# Patient Record
Sex: FEMALE | Race: OTHER | NOT HISPANIC OR LATINO | Employment: UNEMPLOYED | URBAN - METROPOLITAN AREA
[De-identification: names, ages, dates, MRNs, and addresses within clinical notes are randomized per-mention and may not be internally consistent; named-entity substitution may affect disease eponyms.]

---

## 2022-08-15 ENCOUNTER — OFFICE VISIT (OUTPATIENT)
Dept: INTERNAL MEDICINE | Facility: CLINIC | Age: 42
End: 2022-08-15

## 2022-08-15 VITALS
TEMPERATURE: 97.6 F | HEIGHT: 65 IN | OXYGEN SATURATION: 99 % | SYSTOLIC BLOOD PRESSURE: 132 MMHG | HEART RATE: 68 BPM | WEIGHT: 217.8 LBS | DIASTOLIC BLOOD PRESSURE: 90 MMHG | BODY MASS INDEX: 36.29 KG/M2

## 2022-08-15 DIAGNOSIS — Z13.29 THYROID DISORDER SCREEN: ICD-10-CM

## 2022-08-15 DIAGNOSIS — Z13.220 LIPID SCREENING: ICD-10-CM

## 2022-08-15 DIAGNOSIS — R03.0 ELEVATED BLOOD PRESSURE READING: ICD-10-CM

## 2022-08-15 DIAGNOSIS — G89.29 CHRONIC PAIN OF RIGHT KNEE: ICD-10-CM

## 2022-08-15 DIAGNOSIS — M25.561 CHRONIC PAIN OF RIGHT KNEE: ICD-10-CM

## 2022-08-15 DIAGNOSIS — Z00.00 ANNUAL PHYSICAL EXAM: ICD-10-CM

## 2022-08-15 DIAGNOSIS — Z76.89 ESTABLISHING CARE WITH NEW DOCTOR, ENCOUNTER FOR: Primary | ICD-10-CM

## 2022-08-15 LAB
ALBUMIN SERPL-MCNC: 4.2 G/DL (ref 3.5–5.2)
ALBUMIN/GLOB SERPL: 1.4 G/DL
ALP SERPL-CCNC: 64 U/L (ref 39–117)
ALT SERPL W P-5'-P-CCNC: 12 U/L (ref 1–33)
ANION GAP SERPL CALCULATED.3IONS-SCNC: 8.5 MMOL/L (ref 5–15)
AST SERPL-CCNC: 16 U/L (ref 1–32)
BASOPHILS # BLD AUTO: 0.03 10*3/MM3 (ref 0–0.2)
BASOPHILS NFR BLD AUTO: 0.4 % (ref 0–1.5)
BILIRUB SERPL-MCNC: 0.3 MG/DL (ref 0–1.2)
BUN SERPL-MCNC: 7 MG/DL (ref 6–20)
BUN/CREAT SERPL: 9 (ref 7–25)
CALCIUM SPEC-SCNC: 9 MG/DL (ref 8.6–10.5)
CHLORIDE SERPL-SCNC: 102 MMOL/L (ref 98–107)
CHOLEST SERPL-MCNC: 172 MG/DL (ref 0–200)
CO2 SERPL-SCNC: 25.5 MMOL/L (ref 22–29)
CREAT SERPL-MCNC: 0.78 MG/DL (ref 0.57–1)
DEPRECATED RDW RBC AUTO: 39.6 FL (ref 37–54)
EGFRCR SERPLBLD CKD-EPI 2021: 98.6 ML/MIN/1.73
EOSINOPHIL # BLD AUTO: 0.27 10*3/MM3 (ref 0–0.4)
EOSINOPHIL NFR BLD AUTO: 3.8 % (ref 0.3–6.2)
ERYTHROCYTE [DISTWIDTH] IN BLOOD BY AUTOMATED COUNT: 12.7 % (ref 12.3–15.4)
GLOBULIN UR ELPH-MCNC: 3 GM/DL
GLUCOSE SERPL-MCNC: 92 MG/DL (ref 65–99)
HCT VFR BLD AUTO: 42.1 % (ref 34–46.6)
HDLC SERPL-MCNC: 44 MG/DL (ref 40–60)
HGB BLD-MCNC: 14 G/DL (ref 12–15.9)
IMM GRANULOCYTES # BLD AUTO: 0.01 10*3/MM3 (ref 0–0.05)
IMM GRANULOCYTES NFR BLD AUTO: 0.1 % (ref 0–0.5)
LDLC SERPL CALC-MCNC: 104 MG/DL (ref 0–100)
LDLC/HDLC SERPL: 2.3 {RATIO}
LYMPHOCYTES # BLD AUTO: 2.11 10*3/MM3 (ref 0.7–3.1)
LYMPHOCYTES NFR BLD AUTO: 29.4 % (ref 19.6–45.3)
MCH RBC QN AUTO: 28.9 PG (ref 26.6–33)
MCHC RBC AUTO-ENTMCNC: 33.3 G/DL (ref 31.5–35.7)
MCV RBC AUTO: 87 FL (ref 79–97)
MONOCYTES # BLD AUTO: 0.45 10*3/MM3 (ref 0.1–0.9)
MONOCYTES NFR BLD AUTO: 6.3 % (ref 5–12)
NEUTROPHILS NFR BLD AUTO: 4.3 10*3/MM3 (ref 1.7–7)
NEUTROPHILS NFR BLD AUTO: 60 % (ref 42.7–76)
NRBC BLD AUTO-RTO: 0 /100 WBC (ref 0–0.2)
PLATELET # BLD AUTO: 400 10*3/MM3 (ref 140–450)
PMV BLD AUTO: 9.6 FL (ref 6–12)
POTASSIUM SERPL-SCNC: 4 MMOL/L (ref 3.5–5.2)
PROT SERPL-MCNC: 7.2 G/DL (ref 6–8.5)
RBC # BLD AUTO: 4.84 10*6/MM3 (ref 3.77–5.28)
SODIUM SERPL-SCNC: 136 MMOL/L (ref 136–145)
TRIGL SERPL-MCNC: 135 MG/DL (ref 0–150)
TSH SERPL DL<=0.05 MIU/L-ACNC: 0.21 UIU/ML (ref 0.27–4.2)
VLDLC SERPL-MCNC: 24 MG/DL (ref 5–40)
WBC NRBC COR # BLD: 7.17 10*3/MM3 (ref 3.4–10.8)

## 2022-08-15 PROCEDURE — 80053 COMPREHEN METABOLIC PANEL: CPT | Performed by: NURSE PRACTITIONER

## 2022-08-15 PROCEDURE — 80061 LIPID PANEL: CPT | Performed by: NURSE PRACTITIONER

## 2022-08-15 PROCEDURE — 99386 PREV VISIT NEW AGE 40-64: CPT | Performed by: NURSE PRACTITIONER

## 2022-08-15 PROCEDURE — 3008F BODY MASS INDEX DOCD: CPT | Performed by: NURSE PRACTITIONER

## 2022-08-15 PROCEDURE — 2014F MENTAL STATUS ASSESS: CPT | Performed by: NURSE PRACTITIONER

## 2022-08-15 PROCEDURE — 84443 ASSAY THYROID STIM HORMONE: CPT | Performed by: NURSE PRACTITIONER

## 2022-08-15 PROCEDURE — 85025 COMPLETE CBC W/AUTO DIFF WBC: CPT | Performed by: NURSE PRACTITIONER

## 2022-08-15 NOTE — ASSESSMENT & PLAN NOTE
We will go ahead and refer to orthopedics, is very possible that she could have something that is surgical.

## 2022-08-15 NOTE — ASSESSMENT & PLAN NOTE
Instructed patient to monitor blood pressure at home, she does have a cuff already, would like for her to log his blood pressures for the next 4 weeks, if consistently elevated, she does need to come back in for follow-up, patient agrees and understands.    We did discuss decrease in sodium and caffeine intake, which she admits she is guilty of.  Would like to exercise more, but difficult with her knee currently.

## 2022-08-15 NOTE — PROGRESS NOTES
"Chief Complaint  Knee Pain (Both knee going on for long time since 20 years old ), Establish Care, and Employment Physical (For inspiring work )    Subjective         Aliyah Hutson presents to [facility] for Establishment of care, annual physical exam, lab work, and concerns regarding her knee pain.    Patient has not had any primary care for the last 2 years, was seeing a provider in [facility].  Patient does not recall the last time she had any labs performed.  Patient reports that overall she does not have any significant medical problems, she gave birth to her last child about 2 years ago, did have blood pressure issues during pregnancy, developed preeclampsia, delivered early.  She has not been on any medication since then.  Does check her blood pressure occasionally, was slightly elevated today.  Patient's primary concern today is her right knee, she states that when she was 20 she suffered an injury, unclear exactly the type of injury, she has issues with it now popping in and out of place almost weekly.  Patient is applying for a new job, she feels like she will have to have limitations, does have a form that needs to be completed, but she needs a diagnosis for her knee as well.  Was not sure if that was something that we could do or if she would need to be referred to orthopedics.    Otherwise, no significant concerns or complaints today.         Review of Systems    Objective   Vital Signs:   /90 (BP Location: Right arm, Patient Position: Sitting, Cuff Size: Adult)   Pulse 68   Temp 97.6 °F (36.4 °C) (Temporal)   Ht 165.1 cm (65\")   Wt 98.8 kg (217 lb 12.8 oz)   SpO2 99%   BMI 36.24 kg/m²     Physical Exam  Vitals and nursing note reviewed.   Constitutional:       Appearance: Normal appearance. She is obese.   HENT:      Head: Normocephalic and atraumatic.      Nose: Nose normal.      Mouth/Throat:      Mouth: Mucous membranes are moist.      Pharynx: " Oropharynx is clear.   Eyes:      Conjunctiva/sclera: Conjunctivae normal.      Pupils: Pupils are equal, round, and reactive to light.   Cardiovascular:      Rate and Rhythm: Normal rate and regular rhythm.   Pulmonary:      Effort: Pulmonary effort is normal.      Breath sounds: Normal breath sounds.   Neurological:      Mental Status: She is alert.   Psychiatric:         Mood and Affect: Mood normal.         Thought Content: Thought content normal.        Result Review :                   Diagnoses and all orders for this visit:    1. Establishing care with new doctor, encounter for (Primary)    2. Annual physical exam  Assessment & Plan:  Screening labs reviewed/ordered  Counseling provided regarding age appropriate screenings and immunizations, healthy diet and exercise.       Orders:  -     Comprehensive Metabolic Panel  -     CBC & Differential    3. Thyroid disorder screen  -     TSH    4. Lipid screening  -     Lipid Panel    5. Chronic pain of right knee  Assessment & Plan:  We will go ahead and refer to orthopedics, is very possible that she could have something that is surgical.    Orders:  -     Ambulatory Referral to Orthopedic Surgery    6. Elevated blood pressure reading  Assessment & Plan:  Instructed patient to monitor blood pressure at home, she does have a cuff already, would like for her to log his blood pressures for the next 4 weeks, if consistently elevated, she does need to come back in for follow-up, patient agrees and understands.    We did discuss decrease in sodium and caffeine intake, which she admits she is guilty of.  Would like to exercise more, but difficult with her knee currently.          Follow Up   Return in about 3 months (around 11/15/2022) for Recheck.  Patient was given instructions and counseling regarding her condition or for health maintenance advice. Please see specific information pulled into the AVS if appropriate.     TOMAS Smith  8/15/2022  This note was  electronically signed.

## 2022-08-17 ENCOUNTER — OFFICE VISIT (OUTPATIENT)
Dept: ORTHOPEDIC SURGERY | Facility: CLINIC | Age: 42
End: 2022-08-17

## 2022-08-17 ENCOUNTER — TELEPHONE (OUTPATIENT)
Dept: ORTHOPEDIC SURGERY | Facility: CLINIC | Age: 42
End: 2022-08-17

## 2022-08-17 VITALS — HEIGHT: 66 IN | HEART RATE: 66 BPM | WEIGHT: 215 LBS | BODY MASS INDEX: 34.55 KG/M2 | OXYGEN SATURATION: 97 %

## 2022-08-17 DIAGNOSIS — M25.561 RIGHT KNEE PAIN, UNSPECIFIED CHRONICITY: ICD-10-CM

## 2022-08-17 DIAGNOSIS — M25.361 PATELLAR INSTABILITY OF RIGHT KNEE: Primary | ICD-10-CM

## 2022-08-17 DIAGNOSIS — M17.11 PRIMARY OSTEOARTHRITIS OF RIGHT KNEE: ICD-10-CM

## 2022-08-17 PROCEDURE — 99203 OFFICE O/P NEW LOW 30 MIN: CPT | Performed by: STUDENT IN AN ORGANIZED HEALTH CARE EDUCATION/TRAINING PROGRAM

## 2022-08-17 NOTE — PROGRESS NOTES
"Chief Complaint  Pain of the Right Knee    Sherrie Hutson presents to [facility] for   History of Present Illness    The patient presents here today for evaluation of the right knee. She has an old injury to the knee from soccer that was treated conservatively. She denies new injury. She reports numbness to her knee. She reports her knee cap dislocates and she reduces it herself. She has no other complaints.     No Known Allergies     Social History     Socioeconomic History   • Marital status:    Tobacco Use   • Smoking status: Never Smoker   • Smokeless tobacco: Never Used   Vaping Use   • Vaping Use: Never used        I reviewed the patient's chief complaint, history of present illness, review of systems, past medical history, surgical history, family history, social history, medications, and allergy list.     REVIEW OF SYSTEMS    Constitutional: Denies fevers, chills, weight loss  Cardiovascular: Denies chest pain, shortness of breath  Skin: Denies rashes, acute skin changes  Neurologic: Denies headache, loss of consciousness  MSK: Right knee pain      Objective   Vital Signs:   Pulse 66   Ht 167.6 cm (66\")   Wt 97.5 kg (215 lb)   SpO2 97%   BMI 34.70 kg/m²     Body mass index is 34.7 kg/m².    Physical Exam    General: Alert. No acute distress.   Right knee- full extension with valgus alignment. Flexion 120. Positive crepitus. Stable to varus/valgus stress. Stable to anterior/posterior drawer. Negative Lachman's. No effusion. No joint line tenderness. Pain with lateral translation of the patella and apprehension. No joint line with Eduardo's, apprehension to patella with Eduardo's.      Procedures    Imaging Results (Most Recent)     Procedure Component Value Units Date/Time    XR Knee 4+ View Right [[id number]] Resulted: 08/17/22 1106     Updated: 08/17/22 1107    Narrative:      Indications: Right knee pain, history of patella instability    Views: " AP, lateral, sunrise right knee    Findings: No fractures noted.  And osteochondromas present along the   medial face of the proximal tibia.  Mild to moderate tricompartmental   arthritic changes.  Patella tracks laterally, with lateral tilt on the   sunrise view.    Comparative Data: No comparative data available                     Assessment and Plan        XR Knee 4+ View Right    Result Date: 8/17/2022  Narrative: Indications: Right knee pain, history of patella instability Views: AP, lateral, sunrise right knee Findings: No fractures noted.  And osteochondromas present along the medial face of the proximal tibia.  Mild to moderate tricompartmental arthritic changes.  Patella tracks laterally, with lateral tilt on the sunrise view. Comparative Data: No comparative data available        Diagnoses and all orders for this visit:    1. Patellar instability of right knee (Primary)  -     Ambulatory Referral to Physical Therapy Evaluate and treat, Ortho; Stretching, ROM, Strengthening; Full weight bearing    2. Right knee pain, unspecified chronicity  -     XR Knee 4+ View Right    3. Primary osteoarthritis of right knee  -     Ambulatory Referral to Physical Therapy Evaluate and treat, Ortho; Stretching, ROM, Strengthening; Full weight bearing        Discussed the treatment plan with the patient.  I reviewed the x-rays that were obtained today with the patient. Plan for conservative treatment. She was given a lateral J brace today. Order for physical therapy given today.         Call or return if worsening symptoms.    Scribed for Carlos Manuel Patel MD by Sandra Belcher  08/17/2022   10:18 EDT         Follow Up   Return in about 6 weeks (around 9/28/2022).  Patient was given instructions and counseling regarding her condition or for health maintenance advice. Please see specific information pulled into the AVS if appropriate.       I have personally performed the services described in this document as scribed by  the above individual and it is both accurate and complete.     Carlos Manuel Patel MD  08/17/22  12:13 EDT

## 2022-08-17 NOTE — TELEPHONE ENCOUNTER
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     Who is requesting this form or medical record from you: EMPLOYER    How would you like to receive the form or medical records (pick-up, mail, fax): PLEASE UPLOAD TO MY CHART-      Timeframe paperwork needed: ASAP

## 2022-09-08 ENCOUNTER — TREATMENT (OUTPATIENT)
Dept: PHYSICAL THERAPY | Facility: CLINIC | Age: 42
End: 2022-09-08

## 2022-09-08 DIAGNOSIS — G89.29 CHRONIC PAIN OF RIGHT KNEE: Primary | ICD-10-CM

## 2022-09-08 DIAGNOSIS — M25.361 PATELLAR INSTABILITY OF RIGHT KNEE: ICD-10-CM

## 2022-09-08 DIAGNOSIS — M25.561 CHRONIC PAIN OF RIGHT KNEE: Primary | ICD-10-CM

## 2022-09-08 DIAGNOSIS — M17.11 PRIMARY OSTEOARTHRITIS OF RIGHT KNEE: ICD-10-CM

## 2022-09-08 PROCEDURE — 97161 PT EVAL LOW COMPLEX 20 MIN: CPT | Performed by: PHYSICAL THERAPIST

## 2022-09-08 NOTE — PROGRESS NOTES
Physical Therapy Initial Evaluation and Plan of Care        Patient: Aliyah Hutson   : 1980  Diagnosis/ICD-10 Code:  Chronic pain of right knee [M25.561, G89.29]  Referring practitioner: Carlos Manuel Patel MD  Date of Initial Visit: 2022  Today's Date: 2022  Patient seen for 1 sessions           Subjective Questionnaire: LEFS: 45/80      Subjective Evaluation    History of Present Illness  Mechanism of injury: Pt reports that she has had R knee pain since she was 22 years old.  Pt reports original BURAK was that she was playing soccer and another player fell on her R knee and her knee dislocated.  Pt reports that her knee intermittently dislocates and carrie due to instability.  Pt reports intermittent L knee pain due to compensation.  Pt reports that pain has gradually worsened in R knee.  Pt reports that she is unable to jump or run due to instability and R knee pain.  Pt reports that stairs, prolonged walking and squatting are difficult due to pain/instability.  Pt reports that she is taking nothing for pain at this time.  Pt denies swelling.  Pt reports that she is not currently working.     Pain  Current pain ratin  At best pain rating: 3  At worst pain ratin  Quality: pressure, sharp and dull ache  Aggravating factors: ambulation, squatting, stairs, prolonged positioning, repetitive movement, sleeping, standing and movement  Progression: worsening    Social Support  Lives with: spouse and young children    Diagnostic Tests  X-ray: abnormal    Patient Goals  Patient goals for therapy: decreased pain, improved balance, increased motion, increased strength, independence with ADLs/IADLs and return to sport/leisure activities           R knee x-ray results:  Findings: No fractures noted.  And osteochondromas present along the medial face of the proximal tibia.  Mild to moderate tricompartmental arthritic changes.  Patella tracks laterally, with lateral tilt on the sunrise view.     Objective           Static Posture     Head  Forward.    Shoulders  Rounded.    Scapulae  Left protracted and right protracted.    Lumbar Spine   Increased lordosis.     Hip   Hip (Right): Externally rotated.     Knee   Genu valgus.   Knee (Right): Flexed.     Tenderness     Right Knee   Tenderness in the inferior patella, lateral joint line, medial joint line and medial patella. No tenderness in the LCL (distal), LCL (proximal), MCL (distal), MCL (proximal) and pes anserinus.     Neurological Testing     Sensation     Knee   Left Knee   Intact: light touch    Right Knee   Intact: light touch     Comments   Right light touch: bilateral LE light touch sensation intact.     Active Range of Motion   Left Knee   Flexion: 120 degrees   Extension: 4 (lacking 0) degrees     Right Knee   Flexion: 103 degrees with pain  Extension: 8 (lacking 0) degrees with pain    Passive Range of Motion     Right Knee   Flexion: 115 degrees with pain  Extension: 0 degrees with pain    Patellar Mobility     Right Knee Hypermobile in the medial, lateral, superior and inferior patellar tendon(s).     Strength/Myotome Testing     Left Hip   Planes of Motion   Flexion: 4  Extension: 4-  Abduction: 4-    Right Hip   Planes of Motion   Flexion: 3+  Extension: 4-  Abduction: 4-    Left Knee   Flexion: 4-  Extension: 4    Right Knee   Flexion: 4-  Extension: 4-  Quadriceps contraction: poor    Left Ankle/Foot   Dorsiflexion: 5    Right Ankle/Foot   Dorsiflexion: 5    Tests     Right Knee   Positive patellar apprehension.   Negative anterior drawer, lateral Eduardo, medial Eduardo, posterior drawer, valgus stress test at 0 degrees, valgus stress test at 30 degrees, varus stress test at 0 degrees and varus stress test at 30 degrees.     Ambulation     Observational Gait   Gait: antalgic   Walking speed within functional limits. Decreased right stance time.   Right foot contact pattern: heel to toe     General Comments     Knee Comments  No swelling noted in R  knee         Assessment & Plan     Assessment  Impairments: abnormal gait, abnormal muscle firing, abnormal or restricted ROM, activity intolerance, impaired balance, impaired physical strength, lacks appropriate home exercise program, pain with function, safety issue and weight-bearing intolerance  Functional Limitations: lifting, walking, pulling, pushing, uncomfortable because of pain, standing and unable to perform repetitive tasks  Assessment details: Patient presents with signs/symptoms consistent with R knee patellar instability including decreased right knee ROM, bilateral hip and right knee weakness, R patellar hypermobility and reports of pain limiting function during ADLs as shown on the LEFS.  Patient would benefit from skilled PT services in order to address deficits limiting function at this time.  HEP was given to patient this session and education on HEP/diagnosis provided to patient.         Prognosis: good    Goals  Plan Goals: 1. The patient has limited ROM of the right knee.   LTG 1: 12 weeks:  The patient will demonstrate 0 to 125 degrees of ROM for the right knee in order to allow patient to complete prolonged walking, standing, stairs and other ADLs with decreased pain/difficulty.    STATUS:  New   STG 1a:  6 weeks:  The patient will demonstrate 0 to 115 degrees of ROM for the right knee.    STATUS:  New   TREATMENT: Manual therapy, therapeutic exercise, home exercise instruction, and modalities as needed to include:  moist heat, electrical stimulation, ultrasound, and ice.    2. The patient has limited strength of the right knee.   LTG 2: 12 weeks: The patient will demonstrate 5/5 strength for right knee flexion and extension in order to allow patient improved joint stability    STATUS:  New   STG 2a: 6 weeks: The patient will demonstrate 4/5 strength for right knee flexion and extension    STATUS:  New    TREATMENT: Manual therapy, therapeutic exercise, home exercise instruction, aquatic  therapy, and modalities as needed to include:  moist heat, electrical stimulation, ultrasound, and ice.     3. The patient has gait dysfunction.   LTG 3: 12 weeks:  The patient will ambulate without assistive device, independently, for community distances with minimal limp to the right lower extremity in order to improve mobility and allow patient to perform activities such as grocery shopping with greater ease.    STATUS:  New   TREATMENT: Gait training, aquatic therapy, therapeutic exercise, and home exercise instruction.    4. Mobility: Walking/Moving Around Functional Limitation     LTG 4: 12 weeks:  The patient will demonstrate 1-19 % limitation by achieving a score of 65 on the LEFS.    STATUS:  New   TREATMENT:  Manual therapy, therapeutic exercise, home exercise instruction.       Plan  Therapy options: will be seen for skilled therapy services  Planned modality interventions: cryotherapy, electrical stimulation/Russian stimulation, TENS and thermotherapy (hydrocollator packs)  Planned therapy interventions: balance/weight-bearing training, ADL retraining, soft tissue mobilization, strengthening, stretching, therapeutic activities, joint mobilization, home exercise program, gait training, functional ROM exercises, flexibility, body mechanics training, postural training, neuromuscular re-education and manual therapy  Frequency: 2x week  Duration in weeks: 12  Treatment plan discussed with: patient        Timed:         Manual Therapy:    0     mins  53157;     Therapeutic Exercise:    0     mins  17486;     Neuromuscular Edmund:    0    mins  25384;    Therapeutic Activity:     0     mins  01872;     Gait Trainin     mins  66058;     Ultrasound:     0     mins  38860;    Ionto                               0    mins   07574  Self-care  __0__ mins [id number]    Un-Timed:  Electrical Stimulation:    0     mins  47468 ( );  Traction     0     mins 66324  Low Eval     30     Mins  91574  Mod Eval     0      Mins  36903  High Eval                       0     Mins  00837  Hot pack     0     Mins    Cold pack                       0     Mins      Timed Treatment:   0   mins   Total Treatment:     30   mins    PT SIGNATURE: Alexia Peña PT      Electronically signed 9/8/2022  [place] License: [id number]    Initial Certification    Certification Period: 9/8/2022 thru 12/6/2022  NPI: [id number]  I certify that the therapy services are furnished while this patient is under my care.  The services outlined above are required by this patient, and will be reviewed every 90 days.     PHYSICIAN: Carlos Manuel Patel MD      DATE:     Please sign and return via fax to [phone].. Thank you, [facility].

## 2022-09-27 ENCOUNTER — TELEPHONE (OUTPATIENT)
Dept: PHYSICAL THERAPY | Facility: CLINIC | Age: 42
End: 2022-09-27

## 2022-09-27 NOTE — TELEPHONE ENCOUNTER
PT called pt and left a message.  PT called regarding pt cancelling all appts since evaluation and wanted to see if Pt wishes to cancel all follow ups or if appts need to be moved to make it more convient for pt to attend.

## 2022-10-03 ENCOUNTER — DOCUMENTATION (OUTPATIENT)
Dept: PHYSICAL THERAPY | Facility: CLINIC | Age: 42
End: 2022-10-03

## 2022-10-03 NOTE — PROGRESS NOTES
Pt will be discharged due to non-compliance with therapy attendance.  See eval for objective measurements.

## 2025-02-05 ENCOUNTER — TRANSCRIBE ORDERS (OUTPATIENT)
Dept: ADMINISTRATIVE | Facility: HOSPITAL | Age: 45
End: 2025-02-05
Payer: COMMERCIAL

## 2025-02-05 DIAGNOSIS — Z12.31 BREAST CANCER SCREENING BY MAMMOGRAM: Primary | ICD-10-CM

## 2025-02-14 ENCOUNTER — HOSPITAL ENCOUNTER (OUTPATIENT)
Dept: MAMMOGRAPHY | Facility: HOSPITAL | Age: 45
Discharge: HOME | End: 2025-02-14
Admitting: FAMILY MEDICINE
Payer: COMMERCIAL

## 2025-02-14 DIAGNOSIS — Z12.31 BREAST CANCER SCREENING BY MAMMOGRAM: ICD-10-CM

## 2025-02-14 PROCEDURE — 77067 SCR MAMMO BI INCL CAD: CPT

## 2025-02-14 PROCEDURE — 77063 BREAST TOMOSYNTHESIS BI: CPT
